# Patient Record
Sex: FEMALE | Race: OTHER | NOT HISPANIC OR LATINO | Employment: PART TIME | URBAN - METROPOLITAN AREA
[De-identification: names, ages, dates, MRNs, and addresses within clinical notes are randomized per-mention and may not be internally consistent; named-entity substitution may affect disease eponyms.]

---

## 2017-02-11 ENCOUNTER — HOSPITAL ENCOUNTER (EMERGENCY)
Facility: HOSPITAL | Age: 27
Discharge: HOME | End: 2017-02-11
Attending: EMERGENCY MEDICINE
Payer: MEDICAID

## 2017-02-11 VITALS
HEIGHT: 65 IN | BODY MASS INDEX: 25.83 KG/M2 | HEART RATE: 100 BPM | TEMPERATURE: 99 F | DIASTOLIC BLOOD PRESSURE: 55 MMHG | SYSTOLIC BLOOD PRESSURE: 100 MMHG | OXYGEN SATURATION: 100 % | WEIGHT: 155 LBS | RESPIRATION RATE: 18 BRPM

## 2017-02-11 DIAGNOSIS — O23.43 UTI (URINARY TRACT INFECTION) IN PREGNANCY IN THIRD TRIMESTER: Primary | ICD-10-CM

## 2017-02-11 DIAGNOSIS — R07.81 RIB PAIN ON LEFT SIDE: ICD-10-CM

## 2017-02-11 DIAGNOSIS — R51.9 NONINTRACTABLE HEADACHE, UNSPECIFIED CHRONICITY PATTERN, UNSPECIFIED HEADACHE TYPE: ICD-10-CM

## 2017-02-11 LAB
ALBUMIN SERPL BCP-MCNC: 2.5 G/DL
ALP SERPL-CCNC: 67 U/L
ALT SERPL W/O P-5'-P-CCNC: 15 U/L
ANION GAP SERPL CALC-SCNC: 8 MMOL/L
AST SERPL-CCNC: 15 U/L
BACTERIA #/AREA URNS HPF: ABNORMAL /HPF
BASOPHILS # BLD AUTO: 0.02 K/UL
BASOPHILS NFR BLD: 0.2 %
BILIRUB SERPL-MCNC: 0.5 MG/DL
BILIRUB UR QL STRIP: NEGATIVE
BUN SERPL-MCNC: 3 MG/DL
CALCIUM SERPL-MCNC: 8.6 MG/DL
CHLORIDE SERPL-SCNC: 106 MMOL/L
CLARITY UR: ABNORMAL
CO2 SERPL-SCNC: 20 MMOL/L
COLOR UR: YELLOW
CREAT SERPL-MCNC: 0.6 MG/DL
DIFFERENTIAL METHOD: ABNORMAL
EOSINOPHIL # BLD AUTO: 0.1 K/UL
EOSINOPHIL NFR BLD: 0.6 %
ERYTHROCYTE [DISTWIDTH] IN BLOOD BY AUTOMATED COUNT: 13.2 %
EST. GFR  (AFRICAN AMERICAN): >60 ML/MIN/1.73 M^2
EST. GFR  (NON AFRICAN AMERICAN): >60 ML/MIN/1.73 M^2
FLUAV AG SPEC QL IA: NEGATIVE
FLUBV AG SPEC QL IA: NEGATIVE
GLUCOSE SERPL-MCNC: 77 MG/DL
GLUCOSE UR QL STRIP: NEGATIVE
HCT VFR BLD AUTO: 26.9 %
HGB BLD-MCNC: 8.6 G/DL
HGB UR QL STRIP: NEGATIVE
KETONES UR QL STRIP: NEGATIVE
LEUKOCYTE ESTERASE UR QL STRIP: ABNORMAL
LIPASE SERPL-CCNC: 9 U/L
LYMPHOCYTES # BLD AUTO: 1.7 K/UL
LYMPHOCYTES NFR BLD: 16 %
MCH RBC QN AUTO: 25.9 PG
MCHC RBC AUTO-ENTMCNC: 32 %
MCV RBC AUTO: 81 FL
MICROSCOPIC COMMENT: ABNORMAL
MONOCYTES # BLD AUTO: 1 K/UL
MONOCYTES NFR BLD: 9.4 %
NEUTROPHILS # BLD AUTO: 7.8 K/UL
NEUTROPHILS NFR BLD: 73.3 %
NITRITE UR QL STRIP: NEGATIVE
PH UR STRIP: 8 [PH] (ref 5–8)
PLATELET # BLD AUTO: 276 K/UL
PMV BLD AUTO: 9.1 FL
POTASSIUM SERPL-SCNC: 3.8 MMOL/L
PROT SERPL-MCNC: 6.4 G/DL
PROT UR QL STRIP: NEGATIVE
RBC # BLD AUTO: 3.32 M/UL
SODIUM SERPL-SCNC: 134 MMOL/L
SP GR UR STRIP: 1 (ref 1–1.03)
SPECIMEN SOURCE: NORMAL
SQUAMOUS #/AREA URNS HPF: 12 /HPF
URN SPEC COLLECT METH UR: ABNORMAL
UROBILINOGEN UR STRIP-ACNC: NEGATIVE EU/DL
WBC # BLD AUTO: 10.57 K/UL
WBC #/AREA URNS HPF: 20 /HPF (ref 0–5)

## 2017-02-11 PROCEDURE — 96365 THER/PROPH/DIAG IV INF INIT: CPT

## 2017-02-11 PROCEDURE — 85025 COMPLETE CBC W/AUTO DIFF WBC: CPT

## 2017-02-11 PROCEDURE — 83690 ASSAY OF LIPASE: CPT

## 2017-02-11 PROCEDURE — 96375 TX/PRO/DX INJ NEW DRUG ADDON: CPT

## 2017-02-11 PROCEDURE — 96361 HYDRATE IV INFUSION ADD-ON: CPT

## 2017-02-11 PROCEDURE — 87086 URINE CULTURE/COLONY COUNT: CPT

## 2017-02-11 PROCEDURE — 25000003 PHARM REV CODE 250: Performed by: EMERGENCY MEDICINE

## 2017-02-11 PROCEDURE — 80053 COMPREHEN METABOLIC PANEL: CPT

## 2017-02-11 PROCEDURE — 63600175 PHARM REV CODE 636 W HCPCS: Performed by: EMERGENCY MEDICINE

## 2017-02-11 PROCEDURE — 99284 EMERGENCY DEPT VISIT MOD MDM: CPT | Mod: 25

## 2017-02-11 PROCEDURE — 81000 URINALYSIS NONAUTO W/SCOPE: CPT

## 2017-02-11 PROCEDURE — 87400 INFLUENZA A/B EACH AG IA: CPT | Mod: 59

## 2017-02-11 RX ORDER — METOCLOPRAMIDE HYDROCHLORIDE 5 MG/ML
10 INJECTION INTRAMUSCULAR; INTRAVENOUS
Status: COMPLETED | OUTPATIENT
Start: 2017-02-11 | End: 2017-02-11

## 2017-02-11 RX ORDER — ONDANSETRON 4 MG/1
8 TABLET, ORALLY DISINTEGRATING ORAL EVERY 8 HOURS PRN
Qty: 10 TABLET | Refills: 0 | Status: ON HOLD | OUTPATIENT
Start: 2017-02-11 | End: 2017-05-19 | Stop reason: HOSPADM

## 2017-02-11 RX ORDER — ACETAMINOPHEN 500 MG
1000 TABLET ORAL
Status: COMPLETED | OUTPATIENT
Start: 2017-02-11 | End: 2017-02-11

## 2017-02-11 RX ORDER — CEPHALEXIN 500 MG/1
500 CAPSULE ORAL EVERY 8 HOURS
Qty: 21 CAPSULE | Refills: 0 | Status: SHIPPED | OUTPATIENT
Start: 2017-02-11 | End: 2017-02-18

## 2017-02-11 RX ORDER — DIPHENHYDRAMINE HYDROCHLORIDE 50 MG/ML
25 INJECTION INTRAMUSCULAR; INTRAVENOUS
Status: COMPLETED | OUTPATIENT
Start: 2017-02-11 | End: 2017-02-11

## 2017-02-11 RX ADMIN — SODIUM CHLORIDE 1000 ML: 0.9 INJECTION, SOLUTION INTRAVENOUS at 01:02

## 2017-02-11 RX ADMIN — CEFTRIAXONE 1 G: 1 INJECTION, SOLUTION INTRAVENOUS at 03:02

## 2017-02-11 RX ADMIN — METOCLOPRAMIDE 10 MG: 5 INJECTION, SOLUTION INTRAMUSCULAR; INTRAVENOUS at 01:02

## 2017-02-11 RX ADMIN — ACETAMINOPHEN 1000 MG: 500 TABLET ORAL at 01:02

## 2017-02-11 RX ADMIN — DIPHENHYDRAMINE HYDROCHLORIDE 25 MG: 50 INJECTION, SOLUTION INTRAMUSCULAR; INTRAVENOUS at 01:02

## 2017-02-11 NOTE — DISCHARGE INSTRUCTIONS
IF you get flank pain or fever (100.4 degrees F) return to the ER. Marshall;l your OBGYN Monday for recheck this week.

## 2017-02-11 NOTE — ED TRIAGE NOTES
Headache since 5pm yesterday, chest pain x1 week and diarrhea x4 days. Headache feels like throbbing, frontal, no relief with tylenol. No blurred vision at this time and nausea. Chest pain intermittent, mid sternal. Describes pain as sharp. Diarrhea constant for 4 days. Upper abd pain to LUQ. +fetal movement 27 weeks. OBGYN at [facility]. No rupture of membranes

## 2017-02-11 NOTE — ED AVS SNAPSHOT
[facility]  [address] [place] [address]               Gricelda Hu   2017 12:19 PM   ED    Description:  Female : 1990   Department:  [facility]           Your Care was Coordinated By:     Provider Role From To    Alpesh Hughes MD Attending Provider 17 1224 --      Reason for Visit     Pregnant: Ha, Cp, diarrhea           Diagnoses this Visit        Comments    UTI (urinary tract infection) in pregnancy in third trimester    -  Primary     Nonintractable headache, unspecified chronicity pattern, unspecified headache type         Rib pain on left side           ED Disposition     None           To Do List           Follow-up Information     Call Reina Chavez MD.    Specialty:  Obstetrics and Gynecology    Why:  monday for recheck this week.     Contact information:    [address]  [address]  [place] [address]  [phone]         These Medications        Disp Refills Start End    cephALEXin (KEFLEX) 500 MG capsule 21 capsule 0 2017    Take 1 capsule (500 mg total) by mouth every 8 (eight) hours. - Oral    Pharmacy: [id number] - [place] - [address] [facility] Ph #: [phone]       ondansetron (ZOFRAN-ODT) 4 MG TbDL 10 tablet 0 2017     Take 2 tablets (8 mg total) by mouth every 8 (eight) hours as needed (nausea). - Oral    Pharmacy: [facility] #[id number] - [place] - [address] [facility] Ph #: [phone]         [facility]     [facility] -  Assistance  Registered nurses in the [facility] provide clinical advisement, health education, appointment booking, and other advisory services.  Call for this free service at [phone].             Medications           Message regarding Medications     Verify the changes and/or additions to your medication regime listed below are the same as discussed with your clinician today.  If any of these  changes or additions are incorrect, please notify your healthcare provider.        START taking these NEW medications        Refills    cephALEXin (KEFLEX) 500 MG capsule 0    Sig: Take 1 capsule (500 mg total) by mouth every 8 (eight) hours.    Class: Print    Route: Oral    ondansetron (ZOFRAN-ODT) 4 MG TbDL 0    Sig: Take 2 tablets (8 mg total) by mouth every 8 (eight) hours as needed (nausea).    Class: Print    Route: Oral      These medications were administered today        Dose Freq    sodium chloride 0.9% bolus 1,000 mL 1,000 mL ED 1 Time    Sig: Inject 1,000 mLs into the vein ED 1 Time.    Class: Normal    Route: Intravenous    acetaminophen tablet 1,000 mg 1,000 mg ED 1 Time    Sig: Take 2 tablets (1,000 mg total) by mouth ED 1 Time.    Class: Normal    Route: Oral    metoclopramide HCl injection 10 mg 10 mg ED 1 Time    Sig: Inject 2 mLs (10 mg total) into the vein ED 1 Time.    Class: Normal    Route: Intravenous    diphenhydrAMINE injection 25 mg 25 mg ED 1 Time    Sig: Inject 0.5 mLs (25 mg total) into the vein ED 1 Time.    Class: Normal    Route: Intravenous    cefTRIAXone (ROCEPHIN) 1 g in dextrose 5 % 50 mL IVPB 1 g ED 1 Time    Sig: Inject 50 mLs (1 g total) into the vein ED 1 Time.    Class: Normal    Route: Intravenous      STOP taking these medications     ondansetron (ZOFRAN) 4 MG tablet Take 1 tablet (4 mg total) by mouth every 6 (six) hours as needed for Nausea.           Verify that the below list of medications is an accurate representation of the medications you are currently taking.  If none reported, the list may be blank. If incorrect, please contact your healthcare provider. Carry this list with you in case of emergency.           Current Medications     ACETAMINOPHEN (TYLENOL ORAL) Take by mouth.    cefTRIAXone (ROCEPHIN) 1 g in dextrose 5 % 50 mL IVPB Inject 50 mLs (1 g total) into the vein ED 1 Time.    cephALEXin (KEFLEX) 500 MG capsule Take 1 capsule (500 mg total) by mouth  "every 8 (eight) hours.    ondansetron (ZOFRAN-ODT) 4 MG TbDL Take 2 tablets (8 mg total) by mouth every 8 (eight) hours as needed (nausea).           Clinical Reference Information           Your Vitals Were     BP Pulse Temp Resp Height Weight    107/61 (BP Location: Left arm, Patient Position: Sitting) 113 99.7 °F (37.6 °C) (Oral) 20 5' 5" (1.651 m) 70.3 kg (155 lb)    Last Period SpO2 BMI          07/20/2016 (Exact Date) 98% 25.79 kg/m2        Allergies as of 2/11/2017     No Known Allergies      Immunizations Administered on Date of Encounter - 2/11/2017     None      ED Micro, Lab, POCT     Start Ordered       Status Ordering Provider    02/11/17 1258 02/11/17 1257  CBC auto differential  STAT      Final result     02/11/17 1258 02/11/17 1257  Comprehensive metabolic panel  STAT      Final result     02/11/17 1258 02/11/17 1257  Lipase  STAT      Final result     02/11/17 1258 02/11/17 1257  Urinalysis  STAT      Final result     02/11/17 1258 02/11/17 1257  Urine culture **CANNOT BE ORDERED STAT**  Once      In process     02/11/17 1258 02/11/17 1257  Influenza antigen Nasopharyngeal Swab  STAT      Final result     02/11/17 1257 02/11/17 1257  Urinalysis Microscopic  Once      Final result       ED Imaging Orders     None        Discharge Instructions       IF you get flank pain or fever (100.4 degrees F) return to the ER. Marshall;l your OBGYN Monday for recheck this week.     Discharge References/Attachments     HEADACHES, SELF-CARE FOR (ENGLISH)    PREGNANCY, ADAPTING TO: THIRD TRIMESTER (ENGLISH)    PREGNANCY: YOUR THIRD TRIMESTER CHANGES (ENGLISH)    BLADDER INFECTION, FEMALE (ADULT) (ENGLISH)      Your Scheduled Appointments     Feb 16, 2017  8:15 AM CST   Repeat OB Visit-OCC with Reina Chavez MD   [facility] (OCC)    [address] [place] [id number] [phone]               [facility] complies with applicable Federal civil rights laws and does not discriminate on " the basis of race, color, national origin, age, disability, or sex.        Language Assistance Services     ATTENTION: Language assistance services are available, free of charge. Please call [phone].      ATENCIÓN: Si jamal del valle, tiene a carlson disposición servicios gratuitos de asistencia lingüística. Llame al [phone].     CHÚ Ý: N?u b?n nói Ti?ng Vi?t, có các d?ch v? h? tr? ngôn ng? mi?n phí dành cho b?n. G?i s? [phone].

## 2017-02-11 NOTE — ED PROVIDER NOTES
"Encounter Date: 2017    SCRIBE #1 NOTE: I, Samuel Akshat, am scribing for, and in the presence of,  Alpesh Hughes MD. I have scribed the following portions of the note - Other sections scribed: HPI/ROS.       History     Chief Complaint   Patient presents with    Pregnant: Ha, Cp, diarrhea     pt is 27 wks pregnant, EDC 17," i'm having bad bad headaches and i'm having chest pain and i'm also every other hour i been running to the bathroom" c/o HA since yesterday, CP x 1wk, states " i went to the doctor and they said it's pretty normal for pregnancy" denies abd pain/contractions/vag bleeding      Review of patient's allergies indicates:  No Known Allergies  HPI Comments: CC: Headache    HPI: This 26 y.o. Female with a PMHx of a miscarriage (2016) presents to the ED c/o an acute, severe (pain rating 10/10), constant headache which began yesterday at 5 pm. The pt is 27 weeks pregnant and states that there are not any issues with the pregnancy. The pt states that the headache is worst on both sides of her temples and the back of her head and is exacerbated by sitting up. The pt also c/o nausea, frequent urination, sensitivity to light, weakness, diarrhea, and left chest pain. The pt denies fever, sore throat, cough, numbness, or dysuria. The pt took tylenol for pain relief.      The history is provided by the patient. No  was used.     Past Medical History   Diagnosis Date    Miscarriage within last 12 months 2016     No past medical history pertinent negatives.  Past Surgical History   Procedure Laterality Date    Induced        Family History   Problem Relation Age of Onset    Diabetes Paternal Grandmother     Diabetes Maternal Grandmother     Diabetes Sister     Breast cancer Neg Hx     Colon cancer Neg Hx     Ovarian cancer Neg Hx      Social History   Substance Use Topics    Smoking status: Never Smoker    Smokeless tobacco: None    Alcohol use No     Review " of Systems   Constitutional: Negative for fever.   HENT: Negative for sore throat.    Respiratory: Negative for cough and shortness of breath.    Cardiovascular: Positive for chest pain (L).   Gastrointestinal: Positive for diarrhea and nausea.   Genitourinary: Positive for frequency. Negative for dysuria.   Musculoskeletal: Negative for back pain.   Skin: Negative for rash.   Neurological: Positive for weakness and headaches. Negative for numbness.   Hematological: Does not bruise/bleed easily.       Physical Exam   Initial Vitals   BP Pulse Resp Temp SpO2   02/11/17 1202 02/11/17 1202 02/11/17 1202 02/11/17 1202 02/11/17 1202   107/61 113 20 99.7 °F (37.6 °C) 98 %     Physical Exam    Nursing note and vitals reviewed.  Constitutional: She appears well-developed and well-nourished.   HENT:   Head: Normocephalic and atraumatic.   Right Ear: External ear normal.   Left Ear: External ear normal.   Nose: Nose normal.   Mouth/Throat: Oropharynx is clear and moist. No oropharyngeal exudate.   Eyes: EOM are normal. Pupils are equal, round, and reactive to light.   Neck: Normal range of motion. Neck supple. No thyromegaly present. No JVD present.   No meningeal signs   Cardiovascular: Normal rate, regular rhythm, normal heart sounds and intact distal pulses. Exam reveals no gallop and no friction rub.    No murmur heard.  Pulmonary/Chest: Breath sounds normal. No respiratory distress. She has no wheezes. She has no rhonchi. She has no rales.   Patient does have tenderness to the left lower rib tip in the midclavicular line.   Abdominal: Soft. Bowel sounds are normal. She exhibits no distension. There is no tenderness. There is no rebound and no guarding.   Gravid abdomen without tenderness.  Specifically no CVA tenderness.   Musculoskeletal: Normal range of motion. She exhibits no edema or tenderness.   Neurological: She is alert and oriented to person, place, and time. She has normal strength.   Skin: Skin is warm and  dry.         ED Course   Procedures  Labs Reviewed   CBC W/ AUTO DIFFERENTIAL - Abnormal; Notable for the following:        Result Value    RBC 3.32 (*)     Hemoglobin 8.6 (*)     Hematocrit 26.9 (*)     MCV 81 (*)     MCH 25.9 (*)     MPV 9.1 (*)     Gran # 7.8 (*)     Gran% 73.3 (*)     Lymph% 16.0 (*)     All other components within normal limits   COMPREHENSIVE METABOLIC PANEL - Abnormal; Notable for the following:     Sodium 134 (*)     CO2 20 (*)     BUN, Bld 3 (*)     Calcium 8.6 (*)     Albumin 2.5 (*)     All other components within normal limits   URINALYSIS - Abnormal; Notable for the following:     Appearance, UA Cloudy (*)     Leukocytes, UA 3+ (*)     All other components within normal limits   URINALYSIS MICROSCOPIC - Abnormal; Notable for the following:     WBC, UA 20 (*)     Bacteria, UA Moderate (*)     All other components within normal limits   CULTURE, URINE   LIPASE   INFLUENZA A AND B ANTIGEN               patient's headache resolved with IV fluids and therapy provided.  Patient's urinalysis does suggest urinary tract infection with 20 white blood cells, moderate bacteria, 3+ leukocyte esterase.  Rocephin 1 g IV given in the emergency department.  We'll discharge with Keflex.  Patient has had no fever, flank pain, CVA tenderness nor leukocytosis to suggest pyelonephritis.  I discussed with the patient to return for any of these symptoms.  Follow-up with OB/GYN.  Call Monday.  Will prescribe nausea medicine and the antibiotic.  Stable for discharge.  There is no meningeal signs or abnormal neurologic exam to suggest more concerning etiology of the headache.          Scribe Attestation:   Scribe #1: I performed the above scribed service and the documentation accurately describes the services I performed. I attest to the accuracy of the note.    Attending Attestation:           Physician Attestation for Scribe:  Physician Attestation Statement for Scribe #1: I, Alpesh Hughes MD, reviewed  documentation, as scribed by Samuel Oden in my presence, and it is both accurate and complete.                 ED Course     Clinical Impression:   The primary encounter diagnosis was UTI (urinary tract infection) in pregnancy in third trimester. Diagnoses of Nonintractable headache, unspecified chronicity pattern, unspecified headache type and Rib pain on left side were also pertinent to this visit.          Alpesh Hughes MD  02/11/17 [id number]

## 2017-02-13 LAB — BACTERIA UR CULT: NORMAL

## 2017-02-16 PROBLEM — O99.013 ANEMIA AFFECTING PREGNANCY IN THIRD TRIMESTER: Status: ACTIVE | Noted: 2017-02-16

## 2017-05-15 PROBLEM — Z34.03 ENCOUNTER FOR SUPERVISION OF NORMAL FIRST PREGNANCY IN THIRD TRIMESTER: Status: ACTIVE | Noted: 2017-05-15

## 2017-05-16 ENCOUNTER — ANESTHESIA EVENT (OUTPATIENT)
Dept: OBSTETRICS AND GYNECOLOGY | Facility: HOSPITAL | Age: 27
End: 2017-05-16
Payer: MEDICAID

## 2017-05-16 ENCOUNTER — ANESTHESIA (OUTPATIENT)
Dept: OBSTETRICS AND GYNECOLOGY | Facility: HOSPITAL | Age: 27
End: 2017-05-16
Payer: MEDICAID

## 2017-05-16 ENCOUNTER — SURGERY (OUTPATIENT)
Age: 27
End: 2017-05-16

## 2017-05-16 PROBLEM — Z98.891 S/P C-SECTION: Status: ACTIVE | Noted: 2017-05-16

## 2017-05-16 PROCEDURE — 25000003 PHARM REV CODE 250: Performed by: NURSE ANESTHETIST, CERTIFIED REGISTERED

## 2017-05-16 PROCEDURE — 25000003 PHARM REV CODE 250: Performed by: REGISTERED NURSE

## 2017-05-16 PROCEDURE — 62326 NJX INTERLAMINAR LMBR/SAC: CPT | Performed by: ANESTHESIOLOGY

## 2017-05-16 PROCEDURE — 25000003 PHARM REV CODE 250: Performed by: ANESTHESIOLOGY

## 2017-05-16 PROCEDURE — 27800517 HC TRAY,EPIDURAL-CONTINUOUS: Performed by: ANESTHESIOLOGY

## 2017-05-16 PROCEDURE — 27200710 HC EPIDURAL INFUSION PUMP SET: Performed by: ANESTHESIOLOGY

## 2017-05-16 PROCEDURE — 63600175 PHARM REV CODE 636 W HCPCS: Performed by: REGISTERED NURSE

## 2017-05-16 PROCEDURE — 25000003 PHARM REV CODE 250: Performed by: OBSTETRICS & GYNECOLOGY

## 2017-05-16 RX ORDER — LIDOCAINE HCL/EPINEPHRINE/PF 2%-1:200K
VIAL (ML) INJECTION
Status: DISCONTINUED | OUTPATIENT
Start: 2017-05-16 | End: 2017-05-16

## 2017-05-16 RX ORDER — FAMOTIDINE 10 MG/ML
INJECTION INTRAVENOUS
Status: DISCONTINUED | OUTPATIENT
Start: 2017-05-16 | End: 2017-05-16

## 2017-05-16 RX ORDER — METOCLOPRAMIDE HYDROCHLORIDE 5 MG/ML
INJECTION INTRAMUSCULAR; INTRAVENOUS
Status: DISCONTINUED | OUTPATIENT
Start: 2017-05-16 | End: 2017-05-16

## 2017-05-16 RX ORDER — MIDAZOLAM HYDROCHLORIDE 1 MG/ML
INJECTION INTRAMUSCULAR; INTRAVENOUS
Status: DISCONTINUED | OUTPATIENT
Start: 2017-05-16 | End: 2017-05-16

## 2017-05-16 RX ORDER — PROPOFOL 10 MG/ML
VIAL (ML) INTRAVENOUS
Status: DISCONTINUED | OUTPATIENT
Start: 2017-05-16 | End: 2017-05-16

## 2017-05-16 RX ORDER — FENTANYL/BUPIVACAINE/NS/PF 2MCG/ML-.1
PLASTIC BAG, INJECTION (ML) INJECTION CONTINUOUS PRN
Status: DISCONTINUED | OUTPATIENT
Start: 2017-05-16 | End: 2017-05-16

## 2017-05-16 RX ORDER — OXYTOCIN 10 [USP'U]/ML
INJECTION, SOLUTION INTRAMUSCULAR; INTRAVENOUS
Status: DISCONTINUED | OUTPATIENT
Start: 2017-05-16 | End: 2017-05-16

## 2017-05-16 RX ORDER — SODIUM CITRATE AND CITRIC ACID MONOHYDRATE 334; 500 MG/5ML; MG/5ML
SOLUTION ORAL
Status: DISCONTINUED | OUTPATIENT
Start: 2017-05-16 | End: 2017-05-16

## 2017-05-16 RX ORDER — ONDANSETRON HYDROCHLORIDE 2 MG/ML
INJECTION, SOLUTION INTRAMUSCULAR; INTRAVENOUS
Status: DISCONTINUED | OUTPATIENT
Start: 2017-05-16 | End: 2017-05-16

## 2017-05-16 RX ADMIN — SODIUM CITRATE AND CITRIC ACID MONOHYDRATE 30 ML: 500; 334 SOLUTION ORAL at 05:05

## 2017-05-16 RX ADMIN — LIDOCAINE HYDROCHLORIDE,EPINEPHRINE BITARTRATE 3 ML: 20; .005 INJECTION, SOLUTION EPIDURAL; INFILTRATION; INTRACAUDAL; PERINEURAL at 04:05

## 2017-05-16 RX ADMIN — FAMOTIDINE 20 MG: 10 INJECTION, SOLUTION INTRAVENOUS at 05:05

## 2017-05-16 RX ADMIN — LIDOCAINE HYDROCHLORIDE,EPINEPHRINE BITARTRATE 5 ML: 20; .005 INJECTION, SOLUTION EPIDURAL; INFILTRATION; INTRACAUDAL; PERINEURAL at 05:05

## 2017-05-16 RX ADMIN — Medication 20 MG: at 06:05

## 2017-05-16 RX ADMIN — Medication 10 ML/HR: at 07:05

## 2017-05-16 RX ADMIN — LIDOCAINE HYDROCHLORIDE,EPINEPHRINE BITARTRATE 3 ML: 20; .005 INJECTION, SOLUTION EPIDURAL; INFILTRATION; INTRACAUDAL; PERINEURAL at 05:05

## 2017-05-16 RX ADMIN — ONDANSETRON 4 MG: 2 INJECTION, SOLUTION INTRAMUSCULAR; INTRAVENOUS at 06:05

## 2017-05-16 RX ADMIN — METOCLOPRAMIDE 10 MG: 5 INJECTION, SOLUTION INTRAMUSCULAR; INTRAVENOUS at 05:05

## 2017-05-16 RX ADMIN — PROPOFOL 50 MG: 10 INJECTION, EMULSION INTRAVENOUS at 05:05

## 2017-05-16 RX ADMIN — Medication 30 MG: at 06:05

## 2017-05-16 RX ADMIN — MIDAZOLAM HYDROCHLORIDE 2 MG: 1 INJECTION, SOLUTION INTRAMUSCULAR; INTRAVENOUS at 05:05

## 2017-05-16 RX ADMIN — SODIUM CHLORIDE, SODIUM LACTATE, POTASSIUM CHLORIDE, AND CALCIUM CHLORIDE: .6; .31; .03; .02 INJECTION, SOLUTION INTRAVENOUS at 05:05

## 2017-05-16 RX ADMIN — OXYTOCIN 40 UNITS: 10 INJECTION, SOLUTION INTRAMUSCULAR; INTRAVENOUS at 05:05

## 2017-05-16 RX ADMIN — LIDOCAINE HYDROCHLORIDE,EPINEPHRINE BITARTRATE 4 ML: 20; .005 INJECTION, SOLUTION EPIDURAL; INFILTRATION; INTRACAUDAL; PERINEURAL at 04:05

## 2017-05-16 RX ADMIN — LIDOCAINE HYDROCHLORIDE,EPINEPHRINE BITARTRATE 2 ML: 20; .005 INJECTION, SOLUTION EPIDURAL; INFILTRATION; INTRACAUDAL; PERINEURAL at 05:05

## 2017-05-16 NOTE — ANESTHESIA PREPROCEDURE EVALUATION
05/16/2017  Gricelda Hu is a 26 y.o., female.    Anesthesia Evaluation    I have reviewed the Patient Summary Reports.     I have reviewed the Medications.     Review of Systems  Anesthesia Hx:  No problems with previous Anesthesia Denies Hx of Anesthetic complications  History of prior surgery of interest to airway management or planning: Denies Family Hx of Anesthesia complications.   Denies Personal Hx of Anesthesia complications.   Social:  Non-Smoker, No Alcohol Use    Hematology/Oncology:  Hematology Normal   Oncology Normal     EENT/Dental:EENT/Dental Normal   Cardiovascular:  Cardiovascular Normal Exercise tolerance: good     Pulmonary:  Pulmonary Normal    Renal/:  Renal/ Normal     Hepatic/GI:  Hepatic/GI Normal    Musculoskeletal:  Musculoskeletal Normal    Neurological:  Neurology Normal    Endocrine:  Endocrine Normal    Dermatological:  Skin Normal    Psych:  Psychiatric Normal           Physical Exam  General:  Well nourished    Airway/Jaw/Neck:  Airway Findings: Mouth Opening: Normal Tongue: Normal  General Airway Assessment: Adult  Mallampati: II  Improves to II with phonation.  TM Distance: 4 - 6 cm      Dental:  Dental Findings: In tact   Chest/Lungs:  Chest/Lungs Findings: Clear to auscultation, Normal Respiratory Rate     Heart/Vascular:  Heart Findings: Rate: Normal  Rhythm: Regular Rhythm        Mental Status:  Mental Status Findings:  Cooperative, Alert and Oriented         Anesthesia Plan  Type of Anesthesia, risks & benefits discussed:  Anesthesia Type:  general, epidural, CSE  Patient's Preference:   Intra-op Monitoring Plan:   Intra-op Monitoring Plan Comments:   Post Op Pain Control Plan:   Post Op Pain Control Plan Comments:   Induction:   IV  Beta Blocker:  Patient is not currently on a Beta-Blocker (No further documentation required).       Informed Consent: Patient  understands risks and agrees with Anesthesia plan.  Questions answered. Anesthesia consent signed with patient.  ASA Score: 2     Day of Surgery Review of History & Physical:    H&P update referred to the surgeon.         Ready For Surgery From Anesthesia Perspective.     BP (!) 111/52  Pulse 84  Temp 36.7 °C (98 °F) (Oral)   Resp 16  Wt 74.4 kg (164 lb)  LMP 2016 (Exact Date)  SpO2 (!) 92%  Breastfeeding? Yes  BMI 27.29 kg/m2  Wt Readings from Last 3 Encounters:   05/15/17 74.4 kg (164 lb)   17 74.4 kg (164 lb)   17 75.8 kg (167 lb)     BP Readings from Last 3 Encounters:   17 (!) 111/52   17 109/66   17 113/62     Review of patient's allergies indicates:  No Known Allergies  Active Ambulatory Problems     Diagnosis Date Noted    Encounter for (NT) nuchal translucency scan 10/27/2016    Anemia affecting pregnancy in third trimester 2017     Resolved Ambulatory Problems     Diagnosis Date Noted    Pap smear for cervical cancer screening- needs PP pap smear 2016     Past Medical History:   Diagnosis Date    Miscarriage within last 12 months 2016     Past Surgical History:   Procedure Laterality Date    INDUCED          Current Facility-Administered Medications:     acetaminophen tablet 1,000 mg, 1,000 mg, Oral, Q6H PRN, Reina Chavez MD, 1,000 mg at 17 1345    butorphanol injection 2 mg, 2 mg, Intravenous, Q4H PRN, Cherri Cole MD, 2 mg at 17 0423    lactated ringers infusion, , Intravenous, Continuous, Reina Chavez MD, Last Rate: 125 mL/hr at 17 1119    misoprostol tablet 800 mcg, 800 mcg, Rectal, PRN, Reina Chavez MD    ondansetron disintegrating tablet 8 mg, 8 mg, Oral, Q8H PRN, Reina Chavez MD    oxytocin 20 units in 1000 mL lactate ringers infusion, 2 oumar-units/min, Intravenous, Continuous, Cherri Cole MD, Stopped at 17 0915    promethazine (PHENERGAN) 12.5 mg in dextrose 5 % 50 mL IVPB, 12.5 mg,  Intravenous, Q6H PRN, Reina Chavez MD, Last Rate: 150 mL/hr at 05/16/17 0026, 12.5 mg at 05/16/17 0026   Lab Results   Component Value Date    WBC 9.35 05/15/2017    HGB 9.5 (L) 05/15/2017    HCT 31.5 (L) 05/15/2017    MCV 71 (L) 05/15/2017     05/15/2017     No results found for: INR, PROTIME

## 2017-05-16 NOTE — TRANSFER OF CARE
Anesthesia Transfer of Care Note    Patient: Gricelda Hu    Procedure(s) Performed: Procedure(s) (LRB):  DELIVERY- SECTION (N/A)    Patient location: Labor and Delivery    Anesthesia Type: epidural    Transport from OR: Transported from OR on room air with adequate spontaneous ventilation    Post pain: adequate analgesia    Post assessment: no apparent anesthetic complications and tolerated procedure well    Post vital signs: stable    Level of consciousness: awake, alert and oriented    Nausea/Vomiting: no nausea/vomiting    Complications: none    Transfer of care protocol was followed      Last vitals:   Visit Vitals    /67    Pulse (!) 125    Temp 36.8 °C (98.2 °F) (Oral)    Resp 18    Wt 74.4 kg (164 lb)    LMP 2016 (Exact Date)    SpO2 100%    Breastfeeding Yes    BMI 27.29 kg/m2

## 2017-05-16 NOTE — ANESTHESIA PROCEDURE NOTES
Epidural    Patient location during procedure: OB   Reason for block: primary anesthetic   Diagnosis: IUP   Start time: 5/16/2017 7:12 AM  Timeout: 5/16/2017 7:12 AM  End time: 5/16/2017 2:02 PM  Staffing  Anesthesiologist: ANSHUL CHAVARRIA  Performed by: anesthesiologist   Preanesthetic Checklist  Completed: patient identified, pre-op evaluation, timeout performed, IV checked, risks and benefits discussed, monitors and equipment checked, anesthesia consent given, hand hygiene performed and patient being monitored  Preparation  Patient position: sitting  Prep: ChloraPrep  Patient monitoring: Pulse Ox and Blood Pressure  Epidural  Skin Anesthetic: lidocaine 1%  Skin Wheal: 4 mL  Administration type: continuous  Approach: midline  Interspace: L3-4  Injection technique: MARCUS air and MARCUS saline  Needle and Epidural Catheter  Needle type: Tuohy   Needle gauge: 17  Needle length: 3.5 inches  Needle insertion depth: 6 cm  Catheter type: end hole and springwound  Catheter size: 18 G  Catheter at skin depth: 11 cm  Test dose: 3 mL of lidocaine 1.5% with Epi 1-to-200,000  Additional Documentation: incremental injection, no paresthesia on injection, negative aspiration for heme and CSF, no signs/symptoms of IV or SA injection, no significant pain on injection and no significant complaints from patient  Needle localization: anatomical landmarks  Medications:  Bolus administered: 10 mL of 0.25% bupivacaine  Assessment  Upper dermatomal levels - Left: T6  Right: T6  Lower dermatomal level: L1   Dermatomal levels determined by alcohol wipe  Ease of block: easy  Patient's tolerance of the procedure: comfortable throughout block and no complaints

## 2017-05-17 NOTE — ANESTHESIA POSTPROCEDURE EVALUATION
"Anesthesia Post Evaluation    Patient: Gricelda Hu    Procedure(s) Performed: Procedure(s) (LRB):  DELIVERY- SECTION (N/A)    Final Anesthesia Type: epidural  Patient location during evaluation: labor & delivery  Patient participation: Yes- Able to Participate  Level of consciousness: awake  Post-procedure vital signs: reviewed and stable  Pain management: adequate  Airway patency: patent  PONV status at discharge: No PONV  Anesthetic complications: no      Cardiovascular status: stable  Respiratory status: unassisted  Hydration status: euvolemic  Follow-up not needed.        Visit Vitals    BP (!) 111/55    Pulse 94    Temp 35.9 °C (96.6 °F)    Resp 18    Ht 5' 5" (1.651 m)    Wt 73.9 kg (163 lb)    LMP 2016 (Exact Date)    SpO2 99%    Breastfeeding Yes    BMI 27.12 kg/m2       Pain/Angel Score: Pain Assessment Performed: Yes (2017  6:00 AM)  Presence of Pain: complains of pain/discomfort (2017  6:00 AM)  Pain Rating Prior to Med Admin: 5 (2017  5:18 AM)  Pain Rating Post Med Admin: 3 (2017  6:00 AM)      "

## 2017-05-21 ENCOUNTER — TELEPHONE (OUTPATIENT)
Dept: OBSTETRICS AND GYNECOLOGY | Facility: HOSPITAL | Age: 27
End: 2017-05-21

## 2017-05-22 ENCOUNTER — TELEPHONE (OUTPATIENT)
Dept: OBSTETRICS AND GYNECOLOGY | Facility: HOSPITAL | Age: 27
End: 2017-05-22

## 2017-09-05 ENCOUNTER — CLINICAL SUPPORT (OUTPATIENT)
Dept: OCCUPATIONAL MEDICINE | Facility: CLINIC | Age: 27
End: 2017-09-05

## 2017-09-05 DIAGNOSIS — Z02.83 ENCOUNTER FOR EMPLOYMENT-RELATED DRUG TESTING: ICD-10-CM

## 2017-09-05 PROCEDURE — 80305 DRUG TEST PRSMV DIR OPT OBS: CPT | Mod: S$GLB,,, | Performed by: PHYSICIAN ASSISTANT

## 2017-09-08 ENCOUNTER — HOSPITAL ENCOUNTER (EMERGENCY)
Facility: HOSPITAL | Age: 27
Discharge: HOME | End: 2017-09-08
Payer: MEDICAID

## 2017-09-08 VITALS
RESPIRATION RATE: 18 BRPM | SYSTOLIC BLOOD PRESSURE: 121 MMHG | BODY MASS INDEX: 23.32 KG/M2 | OXYGEN SATURATION: 98 % | DIASTOLIC BLOOD PRESSURE: 67 MMHG | TEMPERATURE: 98 F | HEART RATE: 91 BPM | WEIGHT: 140 LBS | HEIGHT: 65 IN

## 2017-09-08 DIAGNOSIS — R10.31 RIGHT LOWER QUADRANT ABDOMINAL PAIN: Primary | ICD-10-CM

## 2017-09-08 DIAGNOSIS — N39.0 URINARY TRACT INFECTION WITHOUT HEMATURIA, SITE UNSPECIFIED: ICD-10-CM

## 2017-09-08 DIAGNOSIS — K29.80 DUODENITIS: ICD-10-CM

## 2017-09-08 LAB
ALBUMIN SERPL BCP-MCNC: 3.9 G/DL
ALP SERPL-CCNC: 94 U/L
ALT SERPL W/O P-5'-P-CCNC: 75 U/L
ANION GAP SERPL CALC-SCNC: 9 MMOL/L
AST SERPL-CCNC: 35 U/L
B-HCG UR QL: NEGATIVE
BACTERIA #/AREA URNS HPF: ABNORMAL /HPF
BASOPHILS # BLD AUTO: 0.04 K/UL
BASOPHILS NFR BLD: 0.5 %
BILIRUB SERPL-MCNC: 0.3 MG/DL
BILIRUB UR QL STRIP: NEGATIVE
BUN SERPL-MCNC: 15 MG/DL
CALCIUM SERPL-MCNC: 9.6 MG/DL
CHLORIDE SERPL-SCNC: 106 MMOL/L
CLARITY UR: ABNORMAL
CO2 SERPL-SCNC: 24 MMOL/L
COLOR UR: YELLOW
CREAT SERPL-MCNC: 0.8 MG/DL
CTP QC/QA: YES
DIFFERENTIAL METHOD: ABNORMAL
EOSINOPHIL # BLD AUTO: 0.1 K/UL
EOSINOPHIL NFR BLD: 1.4 %
ERYTHROCYTE [DISTWIDTH] IN BLOOD BY AUTOMATED COUNT: 14 %
EST. GFR  (AFRICAN AMERICAN): >60 ML/MIN/1.73 M^2
EST. GFR  (NON AFRICAN AMERICAN): >60 ML/MIN/1.73 M^2
GLUCOSE SERPL-MCNC: 88 MG/DL
GLUCOSE UR QL STRIP: NEGATIVE
HCT VFR BLD AUTO: 38.8 %
HGB BLD-MCNC: 12.6 G/DL
HGB UR QL STRIP: ABNORMAL
KETONES UR QL STRIP: NEGATIVE
LEUKOCYTE ESTERASE UR QL STRIP: ABNORMAL
LYMPHOCYTES # BLD AUTO: 3.1 K/UL
LYMPHOCYTES NFR BLD: 38.9 %
MCH RBC QN AUTO: 25.7 PG
MCHC RBC AUTO-ENTMCNC: 32.5 G/DL
MCV RBC AUTO: 79 FL
MICROSCOPIC COMMENT: ABNORMAL
MONOCYTES # BLD AUTO: 0.5 K/UL
MONOCYTES NFR BLD: 6.4 %
NEUTROPHILS # BLD AUTO: 4.3 K/UL
NEUTROPHILS NFR BLD: 52.8 %
NITRITE UR QL STRIP: NEGATIVE
PH UR STRIP: 6 [PH] (ref 5–8)
PLATELET # BLD AUTO: 327 K/UL
PMV BLD AUTO: 9 FL
POTASSIUM SERPL-SCNC: 4.2 MMOL/L
PROT SERPL-MCNC: 7.9 G/DL
PROT UR QL STRIP: NEGATIVE
RBC # BLD AUTO: 4.9 M/UL
RBC #/AREA URNS HPF: 2 /HPF (ref 0–4)
SODIUM SERPL-SCNC: 139 MMOL/L
SP GR UR STRIP: 1.03 (ref 1–1.03)
SQUAMOUS #/AREA URNS HPF: 17 /HPF
URN SPEC COLLECT METH UR: ABNORMAL
UROBILINOGEN UR STRIP-ACNC: ABNORMAL EU/DL
WBC # BLD AUTO: 8.08 K/UL
WBC #/AREA URNS HPF: 25 /HPF (ref 0–5)

## 2017-09-08 PROCEDURE — 80053 COMPREHEN METABOLIC PANEL: CPT

## 2017-09-08 PROCEDURE — 25500020 PHARM REV CODE 255: Performed by: NURSE PRACTITIONER

## 2017-09-08 PROCEDURE — 81025 URINE PREGNANCY TEST: CPT | Performed by: NURSE PRACTITIONER

## 2017-09-08 PROCEDURE — 81000 URINALYSIS NONAUTO W/SCOPE: CPT

## 2017-09-08 PROCEDURE — 85025 COMPLETE CBC W/AUTO DIFF WBC: CPT

## 2017-09-08 PROCEDURE — 99284 EMERGENCY DEPT VISIT MOD MDM: CPT | Mod: 25

## 2017-09-08 RX ORDER — NITROFURANTOIN 25; 75 MG/1; MG/1
100 CAPSULE ORAL 2 TIMES DAILY
Qty: 14 CAPSULE | Refills: 0 | Status: SHIPPED | OUTPATIENT
Start: 2017-09-08 | End: 2017-09-15

## 2017-09-08 RX ORDER — NITROFURANTOIN 25; 75 MG/1; MG/1
100 CAPSULE ORAL 2 TIMES DAILY
Qty: 14 CAPSULE | Refills: 0 | Status: SHIPPED | OUTPATIENT
Start: 2017-09-08 | End: 2017-09-08

## 2017-09-08 RX ADMIN — IOHEXOL 75 ML: 350 INJECTION, SOLUTION INTRAVENOUS at 09:09

## 2017-09-08 RX ADMIN — IOHEXOL 15 ML: 300 INJECTION, SOLUTION INTRAVENOUS at 09:09

## 2017-09-09 NOTE — ED PROVIDER NOTES
"Encounter Date: 2017       History     Chief Complaint   Patient presents with    Wound Check     " I had a  section in May and the scar started bothering me Monday. I was evaluated by urgent care, they told me to come for a possible infection."     CC: Abdominal pain  HPI: This is a 27yof with no significant PMH who presents to the ER for evaluation of R lower abdominal pain since . She was concerned that her pain was related to her  scar which was performed in mid-May. Pain is constant and worse upon laying on her stomach, walking, and heavy lifting. She [place] [facility] [place] and sent to the ER for further evaluation. She denies redness/swelling to scar, pus like drainage from scar, N/V, fever, previous episodes, urinary complaints, or vaginal d/c. She denies similar episodes in the past.       The history is provided by the patient.     Review of patient's allergies indicates:  No Known Allergies  Past Medical History:   Diagnosis Date    Miscarriage within last 12 months 2016     Past Surgical History:   Procedure Laterality Date     SECTION      INDUCED        Family History   Problem Relation Age of Onset    Diabetes Paternal Grandmother     Diabetes Maternal Grandmother     Diabetes Sister     Breast cancer Neg Hx     Colon cancer Neg Hx     Ovarian cancer Neg Hx      Social History   Substance Use Topics    Smoking status: Never Smoker    Smokeless tobacco: Never Used    Alcohol use No     Review of Systems   Constitutional: Negative for fever.   HENT: Negative for congestion, ear pain and facial swelling.    Eyes: Negative for pain, discharge and itching.   Respiratory: Negative for shortness of breath.    Cardiovascular: Negative for chest pain.   Gastrointestinal: Positive for abdominal pain. Negative for constipation, nausea and vomiting.   Genitourinary: Negative for dysuria, frequency, hematuria, urgency, vaginal bleeding and vaginal " discharge.   Musculoskeletal: Negative for back pain.   Skin: Negative for rash.   Neurological: Negative for dizziness and weakness.   Psychiatric/Behavioral: The patient is not nervous/anxious.        Physical Exam     Initial Vitals [09/08/17 1857]   BP Pulse Resp Temp SpO2   (!) 107/59 92 18 98.4 °F (36.9 °C) 97 %      MAP       75         Physical Exam    Constitutional: She appears well-developed and well-nourished.   HENT:   Nose: Nose normal. No rhinorrhea. No epistaxis.   Mouth/Throat: Uvula is midline, oropharynx is clear and moist and mucous membranes are normal. No trismus in the jaw.   Eyes: EOM are normal. Pupils are equal, round, and reactive to light. Right eye exhibits no discharge and no exudate. Left eye exhibits no discharge and no exudate.   Neck: Normal range of motion, full passive range of motion without pain and phonation normal. Neck supple.   Cardiovascular: Normal rate and regular rhythm.   Pulmonary/Chest: Effort normal and breath sounds normal. No accessory muscle usage. No tachypnea. No respiratory distress.   Abdominal: Soft. Bowel sounds are normal. There is tenderness in the right lower quadrant. There is no rigidity, no rebound and no guarding.   No peritoneal signs. Negative psoas and rovsing sign.   Musculoskeletal:   Full ROM of BUE and BLE without difficulties.   Neurological: She is alert and oriented to person, place, and time. She has normal strength. No sensory deficit. She displays a negative Romberg sign.   Skin: Skin is warm and intact. No rash noted.   Well healed suprapubic surgical scar-no swelling, erythema, or purulent drainage.         ED Course   Procedures  Labs Reviewed   CBC W/ AUTO DIFFERENTIAL   COMPREHENSIVE METABOLIC PANEL   URINALYSIS   POCT URINE PREGNANCY             Medical Decision Making:   Differential Diagnosis:   Appendicitis, UTI, ectopic pregnancy, TOA, PID.  ED Management:  Patient presents to the ER with R lower abdominal pain.  Upon my  assessment abdomen is soft, nondistended with right lower abdominal tenderness-no peritoneal signs. CBC negative for leukocytosis and anemia. CMP unremarkable. UA warrants and infection with 3+ leukocytes, 25 WBC/hpf, and moderate bacteria-I will d/c patient with Rx for Macrobid. CT abdomen negative for appendicitis or acute changes around  site. Incidentally it does reveal mild walk thickening and distention involving the duodenum-which does not correlate with patient's R lower abdominal pain. Results discussed with patient. I feel comfortable d/c patient home and having her f/u with her OB/GYN for her abdominal pain. Given the incidental finding of duodenitis I will also d/c patient with GI referral. Discussed with patient in detail to return to the ER for worsening symptoms, fever, intractable vomiting or new symptoms/concerns. Patient verbalizes understanding and agrees with tx plan.  Other:   I discussed test(s) with the performing physician.              Attending Attestation:     Physician Attestation Statement for NP/PA:   I discussed this assessment and plan of this patient with the NP/PA, but I did not personally examine the patient. The face to face encounter was performed by the NP/PA.                  ED Course      Clinical Impression:   The primary encounter diagnosis was Right lower quadrant abdominal pain. Diagnoses of Urinary tract infection without hematuria, site unspecified and Duodenitis were also pertinent to this visit.                           Isabelle Phoenix NP  [phone]       Marco Figueroa MD  09/10/17 [id number]

## 2017-09-09 NOTE — DISCHARGE INSTRUCTIONS
Return to the ER for worsening symptoms, fever, intractable vomiting, back pain, or new symptoms/concerns. Make sure you take all your antibiotics as prescribed even if your symptoms improve/resolve.

## 2017-09-09 NOTE — ED TRIAGE NOTES
Patient c/o pain to her c section incision site on the right side since Monday. Patient was seen at urgent care and told to come to ED for further evaluation.

## 2017-11-21 RX ORDER — IBUPROFEN 800 MG/1
TABLET ORAL
Qty: 40 TABLET | Refills: 0 | Status: SHIPPED | OUTPATIENT
Start: 2017-11-21 | End: 2019-01-24

## 2017-12-06 ENCOUNTER — HOSPITAL ENCOUNTER (EMERGENCY)
Facility: OTHER | Age: 27
Discharge: HOME | End: 2017-12-06
Attending: EMERGENCY MEDICINE
Payer: MEDICAID

## 2017-12-06 VITALS
HEIGHT: 65 IN | TEMPERATURE: 99 F | WEIGHT: 140 LBS | HEART RATE: 88 BPM | SYSTOLIC BLOOD PRESSURE: 110 MMHG | BODY MASS INDEX: 23.32 KG/M2 | RESPIRATION RATE: 18 BRPM | OXYGEN SATURATION: 98 % | DIASTOLIC BLOOD PRESSURE: 59 MMHG

## 2017-12-06 DIAGNOSIS — J06.9 VIRAL URI: Primary | ICD-10-CM

## 2017-12-06 DIAGNOSIS — S93.509A TOE SPRAIN, INITIAL ENCOUNTER: ICD-10-CM

## 2017-12-06 LAB
AMORPH CRY URNS QL MICRO: ABNORMAL
B-HCG UR QL: NEGATIVE
BACTERIA #/AREA URNS HPF: ABNORMAL /HPF
BILIRUB UR QL STRIP: NEGATIVE
CLARITY UR: ABNORMAL
COLOR UR: YELLOW
CTP QC/QA: YES
FLUAV AG SPEC QL IA: NEGATIVE
FLUBV AG SPEC QL IA: NEGATIVE
GLUCOSE UR QL STRIP: NEGATIVE
HGB UR QL STRIP: NEGATIVE
KETONES UR QL STRIP: NEGATIVE
LEUKOCYTE ESTERASE UR QL STRIP: ABNORMAL
MICROSCOPIC COMMENT: ABNORMAL
NITRITE UR QL STRIP: NEGATIVE
PH UR STRIP: 7 [PH] (ref 5–8)
PROT UR QL STRIP: NEGATIVE
RBC #/AREA URNS HPF: 3 /HPF (ref 0–4)
SP GR UR STRIP: 1.02 (ref 1–1.03)
SPECIMEN SOURCE: NORMAL
SQUAMOUS #/AREA URNS HPF: 11 /HPF
URN SPEC COLLECT METH UR: ABNORMAL
UROBILINOGEN UR STRIP-ACNC: 1 EU/DL
WBC #/AREA URNS HPF: 9 /HPF (ref 0–5)

## 2017-12-06 PROCEDURE — 99284 EMERGENCY DEPT VISIT MOD MDM: CPT

## 2017-12-06 PROCEDURE — 87400 INFLUENZA A/B EACH AG IA: CPT

## 2017-12-06 PROCEDURE — 81000 URINALYSIS NONAUTO W/SCOPE: CPT

## 2017-12-06 PROCEDURE — 81025 URINE PREGNANCY TEST: CPT | Performed by: EMERGENCY MEDICINE

## 2017-12-06 RX ORDER — FLUTICASONE PROPIONATE 50 MCG
1 SPRAY, SUSPENSION (ML) NASAL 2 TIMES DAILY PRN
Qty: 15 G | Refills: 0 | Status: SHIPPED | OUTPATIENT
Start: 2017-12-06

## 2017-12-06 NOTE — ED NOTES
4th toe on right foot with pain after striking toolbox last week.  Also with sinusitis for 3-4 days

## 2017-12-06 NOTE — ED PROVIDER NOTES
Encounter Date: 2017       History     Chief Complaint   Patient presents with    Toe Pain     trauma to toe last week reports it is  and swollen    URI     cough and sinus drainage w/ chills and body aches     The patient is a 27-year-old female with no significant past medical history presents to the ED with toe pain and sinus congestion.  Patient states she stubbed her right, 4th toe on her toolbox 10 days ago.  She reports pain with ambulation and wearing shoes.  She states she is able to bear weight on this foot.  She denies any numbness or tingling.  She denies taking any medication for relief.  She also reports a one-day history of sinus congestion, sinus pressure, general malaise, and body aches.  She denies fever or chills.  She denies nausea or vomiting.       The history is provided by the patient.     Review of patient's allergies indicates:  No Known Allergies  Past Medical History:   Diagnosis Date    Miscarriage within last 12 months 2016     Past Surgical History:   Procedure Laterality Date     SECTION      INDUCED        Family History   Problem Relation Age of Onset    Diabetes Paternal Grandmother     Diabetes Maternal Grandmother     Diabetes Sister     Breast cancer Neg Hx     Colon cancer Neg Hx     Ovarian cancer Neg Hx      Social History   Substance Use Topics    Smoking status: Never Smoker    Smokeless tobacco: Never Used    Alcohol use No     Review of Systems   Constitutional: Negative for chills and fever.        General malaise and body aches   HENT: Positive for postnasal drip, sinus pain and sinus pressure. Negative for congestion, sore throat and trouble swallowing.    Eyes: Negative for pain.   Respiratory: Negative for shortness of breath.    Cardiovascular: Negative for chest pain.   Gastrointestinal: Negative for abdominal pain, diarrhea, nausea and vomiting.   Genitourinary: Negative for dysuria and hematuria.    Musculoskeletal:        Right fourth toe pain   Skin: Negative for rash.   Neurological: Negative for headaches.       Physical Exam     Initial Vitals [12/06/17 1342]   BP Pulse Resp Temp SpO2   125/76 68 16 98.6 °F (37 °C) 99 %      MAP       92.33         Physical Exam    Constitutional: Vital signs are normal. She is cooperative.   Nontoxic appearing.  She is speaking in clear and full sentences   HENT:   Head: Normocephalic and atraumatic.   TM without erythema, bulging, or effusion bilaterally.  Good cone of light.  Inflamed nasal turbinates   Eyes: Conjunctivae and EOM are normal. Pupils are equal, round, and reactive to light.   Neck: Normal range of motion. Neck supple.   Cardiovascular: Normal rate, regular rhythm and intact distal pulses.   Pulmonary/Chest: Breath sounds normal. She has no wheezes. She has no rhonchi. She has no rales.   Abdominal: Soft. Bowel sounds are normal. There is no tenderness. There is no rebound and no guarding.   Musculoskeletal:   Full range of motion to all digits on right foot. No pain with palpation to medial or lateral malleolus.  Pain with palpation to the fourth toe to right foot with contusion present.   Neurological: She is alert and oriented to person, place, and time. No cranial nerve deficit. GCS eye subscore is 4. GCS verbal subscore is 5. GCS motor subscore is 6.   Normal sensation to light touch.  Normal strength with resistance to dorsi flexion and plantar flexion to right foot   Skin: Skin is warm and dry. Capillary refill takes less than 2 seconds. No rash noted.   Psychiatric: She has a normal mood and affect. Her behavior is normal.         ED Course   Procedures  Labs Reviewed   URINALYSIS - Abnormal; Notable for the following:        Result Value    Appearance, UA Cloudy (*)     Leukocytes, UA Trace (*)     All other components within normal limits   URINALYSIS MICROSCOPIC - Abnormal; Notable for the following:     WBC, UA 9 (*)     All other components  within normal limits   INFLUENZA A AND B ANTIGEN   POCT URINE PREGNANCY             Medical Decision Making:   Initial Assessment:   Urgent evaluation of a 27 y.o. female , presenting to the emergency department complaining of toe pain and congestion. Patient is afebrile, nontoxic appearing and hemodynamically stable.  ED Management:  Based upon history and physical exam, the patient's symtpoms appear to be secondary to their viral upper respiratory infection. I do not believe the patient to have pneumonia, serious bacterial infection, sepsis, severe dehydration, or hypoxia to warrant further workup at this time.  The patient was advised upon use of OTC medications for fever control and supportive care for their URI.  Will prescribe Flonase.  Patient with 10 day history of toe pain secondary to injury.  X-ray reveals no acute fracture or dislocation, but incidentally found fusion of the fourth and fifth digits on the right foot which is a normal variant.  We'll buddy tape toe for support.  Advised to use ibuprofen as needed for her pain.   I have given specific return precautions. I have discussed the patient with the attending thoroughly and he/she agrees to the treatment and plan.   Other:   I have discussed this case with another health care provider.  This note was created using Dragon Medical Dictation. There may be typographical errors secondary to dictation.                    ED Course      Clinical Impression:     1. Viral URI    2. Toe sprain, initial encounter                             Mickey Thakkar PA-C  12/06/17 [id number]

## 2018-10-24 ENCOUNTER — OFFICE VISIT (OUTPATIENT)
Dept: ALLERGY | Facility: CLINIC | Age: 28
End: 2018-10-24
Payer: MEDICAID

## 2018-10-24 VITALS
SYSTOLIC BLOOD PRESSURE: 98 MMHG | BODY MASS INDEX: 25.78 KG/M2 | WEIGHT: 151 LBS | DIASTOLIC BLOOD PRESSURE: 72 MMHG | HEIGHT: 64 IN

## 2018-10-24 DIAGNOSIS — J30.89 NON-SEASONAL ALLERGIC RHINITIS DUE TO OTHER ALLERGIC TRIGGER: Primary | ICD-10-CM

## 2018-10-24 PROCEDURE — 99999 PR PBB SHADOW E&M-EST. PATIENT-LVL III: CPT | Mod: PBBFAC,,, | Performed by: PEDIATRICS

## 2018-10-24 PROCEDURE — 99213 OFFICE O/P EST LOW 20 MIN: CPT | Mod: PBBFAC | Performed by: PEDIATRICS

## 2018-10-24 PROCEDURE — 99204 OFFICE O/P NEW MOD 45 MIN: CPT | Mod: S$PBB,,, | Performed by: PEDIATRICS

## 2018-10-24 RX ORDER — AZELASTINE 1 MG/ML
2 SPRAY, METERED NASAL 2 TIMES DAILY
Qty: 4 ML | Refills: 3 | Status: SHIPPED | OUTPATIENT
Start: 2018-10-24 | End: 2019-02-19

## 2018-10-24 RX ORDER — PREDNISONE 20 MG/1
40 TABLET ORAL DAILY
Qty: 8 TABLET | Refills: 0 | Status: SHIPPED | OUTPATIENT
Start: 2018-10-24 | End: 2018-10-28

## 2018-10-24 RX ORDER — DIPHENHYDRAMINE HCL 25 MG
25 CAPSULE ORAL EVERY 6 HOURS PRN
COMMUNITY
End: 2019-01-24

## 2018-10-24 NOTE — PROGRESS NOTES
ALLERGY & IMMUNOLOGY CLINIC - INITIAL CONSULTATION     HISTORY OF PRESENT ILLNESS     Patient ID: Gricelda Hu is a 28 y.o. female    CC: Chronic Rhinitis    HPI:  28 year old female with past medical history of allergic rhinitis presents with worsening of symptoms over the past 2 months. Reports she has had allergy symptoms since she was a young child. Symptoms were perennial but intermittent. She had allergy skin prick  testing done when she was a teenager living in [place] and had positive skin test to dog. She was started on weekly allergy shots for a couple of months prior to moving to [place]. She has now been down in [place] for 5 years.      For the past 8 weeks her symptoms have been constant and unremitting. Symptoms include rhinorrhea, congestion, sinus pressure, itchy watery eyes, sneezing, post nasal drip. Has taken antihistamines and fluticasone nasal spray in the past. She used fluticasone daily for 6-8 weeks and did not see any significant improvement. Of note her  got a bulldog three months ago that now lives in their house. This was one month prior to the onset of her symptoms.    Denies any history of asthma, eczema, drug allergies, food allergies, latex allergy, hymenoptera allergy. Denies any history of frequent infections and denies ever being treated for a sinus infection. Only medication is depo-porvera injection. Has a surgical history of one previous caesarian.    Patient is in the [employer] and lives at [facility] in [place].  Family history is significant for a mother with asthma and allergic rhinitis.     REVIEW OF SYSTEMS   Review of Systems   Constitutional: Negative.    HENT: Positive for congestion and sinus pain.    Eyes: Positive for redness.   Respiratory: Negative.    Cardiovascular: Negative.    Gastrointestinal: Negative.    Genitourinary: Negative.    Musculoskeletal: Negative.    Skin: Negative.    Neurological: Negative.    Endo/Heme/Allergies:  Negative.    Psychiatric/Behavioral: Negative.         MEDICAL HISTORY     Past Medical History:   Diagnosis Date    Allergy     Miscarriage within last 12 months 04/2016        PHYSICAL EXAM   Physical Exam   Constitutional: She is oriented to person, place, and time. She appears well-developed and well-nourished. No distress.   HENT:   Head: Normocephalic and atraumatic.   Right Ear: External ear normal.   Left Ear: External ear normal.   Mouth/Throat: Oropharynx is clear and moist.   Significant inflammation of nasal turbinates. Pale pinkish mucosa. Narrowing of nasal passages.   Eyes: Conjunctivae and EOM are normal. Pupils are equal, round, and reactive to light. Right eye exhibits no discharge. Left eye exhibits no discharge.   Neck: Normal range of motion.   Cardiovascular: Normal rate, regular rhythm, normal heart sounds and intact distal pulses. Exam reveals no gallop and no friction rub.   No murmur heard.  Pulmonary/Chest: Effort normal and breath sounds normal. No stridor. No respiratory distress. She has no wheezes. She has no rales. She exhibits no tenderness.   Abdominal: Soft.   Musculoskeletal: Normal range of motion. She exhibits no edema, tenderness or deformity.   Neurological: She is alert and oriented to person, place, and time. No cranial nerve deficit.        LABORATORY STUDIES     Recent Results (from the past 72 hour(s))   IgE    Collection Time: 10/24/18  2:51 PM   Result Value Ref Range    IgE <35 0 - 100 IU/mL        ALLERGEN TESTING     Immunocaps: .Drawn today in clinic     CHART REVIEW   Reviewed previous notes   ASSESSMENT & PLAN     Gricelda Hu is a 28 y.o. female with     Non-seasonal allergic rhinitis due to other allergic trigger: History of allergic rhinitis with positive SPT to dogs. Will send immuno caps today to confirm. Symptoms have worsened acutely since her  got a dog that now lives with them. Due to the significant swelling in the nares will treat with a  short course of oral steroids. Recommend afrin use only for the next couple of days and should then discontinue. Recommend then restarting fluticasone 2 SEN BID and to add on azelastine 2 SEN BID. Discussed the possibility of doing immunotherapy if symptoms persist. For now will work on optimizing medical management.  -     azelastine (ASTELIN) 137 mcg (0.1 %) nasal spray; 2 sprays (274 mcg total) by Nasal route 2 (two) times daily.  Dispense: 4 mL; Refill: 3  -     predniSONE (DELTASONE) 20 MG tablet; Take 2 tablets (40 mg total) by mouth once daily. for 4 days  Dispense: 8 tablet; Refill: 0  -     Dermatophagoides [place]; Future; Expected date: 10/24/2018  -     Dermatophagoides Pteronyssinus; Future; Expected date: 10/24/2018  -     Bermuda; Future; Expected date: 10/24/2018  -     Rico; Future; Expected date: 10/24/2018  -     Gruver; Future; Expected date: 10/24/2018  -     English Plantain; Future; Expected date: 10/24/2018  -     Sulphur; Future; Expected date: 10/24/2018  -     Pecan; Future; Expected date: 10/24/2018  -     Gallegos Elder; Future; Expected date: 10/24/2018  -     Ragweed; Future; Expected date: 10/24/2018  -     Alternaria; Future; Expected date: 10/24/2018  -     Aspergillus; Future; Expected date: 10/24/2018  -     Cat; Future; Expected date: 10/24/2018  -     Cockroach; Future; Expected date: 10/24/2018  -     Dog; Future; Expected date: 10/24/2018  -     IgE; Future; Expected date: 10/24/2018    Follow up: 4-6 weeks     Jonh Jaimes DO  Allergy Immunology Fellow

## 2019-02-19 PROBLEM — Z34.03 ENCOUNTER FOR SUPERVISION OF NORMAL FIRST PREGNANCY IN THIRD TRIMESTER: Status: RESOLVED | Noted: 2017-05-15 | Resolved: 2019-02-19

## 2019-02-19 PROBLEM — O99.013 ANEMIA AFFECTING PREGNANCY IN THIRD TRIMESTER: Status: RESOLVED | Noted: 2017-02-16 | Resolved: 2019-02-19

## 2020-12-23 NOTE — ED NOTES
Called MICU charge for update on RN arrival for report.   Patient placed on continuous cardiac monitor, automatic blood pressure cuff and continuous pulse oximeter.

## 2021-04-26 ENCOUNTER — PATIENT MESSAGE (OUTPATIENT)
Dept: RESEARCH | Facility: HOSPITAL | Age: 31
End: 2021-04-26

## 2022-01-11 ENCOUNTER — TELEPHONE (OUTPATIENT)
Dept: SURGERY | Facility: CLINIC | Age: 32
End: 2022-01-11

## 2022-01-11 NOTE — TELEPHONE ENCOUNTER
This patient was contacted regarding a referral  to have the monoclonal antibody administration at [facility]. It was explained to the patient we are administering the medication by injections into the tissue, instead of the intravenous infusion. It was further explained it consist of four small needle injections with a 90-minute appointment.   It was emphasized to the patient you must bring a copy of the positive covid-19 test outcome to the appointment. It was additionally explained to the patient, the positive test must be from a health care provider to receive the antibody injections. The patient acknowledged understanding having to bring  a copy of the positive covid-19 test to the antibody injection appointment.